# Patient Record
Sex: MALE | Race: WHITE
[De-identification: names, ages, dates, MRNs, and addresses within clinical notes are randomized per-mention and may not be internally consistent; named-entity substitution may affect disease eponyms.]

---

## 2018-07-25 ENCOUNTER — HOSPITAL ENCOUNTER (EMERGENCY)
Dept: HOSPITAL 97 - ER | Age: 38
Discharge: HOME | End: 2018-07-25
Payer: COMMERCIAL

## 2018-07-25 DIAGNOSIS — Z88.1: ICD-10-CM

## 2018-07-25 DIAGNOSIS — R53.1: ICD-10-CM

## 2018-07-25 DIAGNOSIS — Z96.643: ICD-10-CM

## 2018-07-25 DIAGNOSIS — E78.5: ICD-10-CM

## 2018-07-25 DIAGNOSIS — R42: Primary | ICD-10-CM

## 2018-07-25 DIAGNOSIS — I10: ICD-10-CM

## 2018-07-25 LAB
ALBUMIN SERPL BCP-MCNC: 4.3 G/DL (ref 3.4–5)
ALP SERPL-CCNC: 68 U/L (ref 45–117)
ALT SERPL W P-5'-P-CCNC: 28 U/L (ref 12–78)
AST SERPL W P-5'-P-CCNC: 16 U/L (ref 15–37)
BUN BLD-MCNC: 15 MG/DL (ref 7–18)
CKMB CREATINE KINASE MB: < 1 NG/ML (ref 0.3–3.6)
GLUCOSE SERPLBLD-MCNC: 100 MG/DL (ref 74–106)
HCT VFR BLD CALC: 39.7 % (ref 39.6–49)
INR BLD: 0.97
LYMPHOCYTES # SPEC AUTO: 2.6 K/UL (ref 0.7–4.9)
MAGNESIUM SERPL-MCNC: 2.2 MG/DL (ref 1.8–2.4)
MCH RBC QN AUTO: 29.8 PG (ref 27–35)
MCV RBC: 87.4 FL (ref 80–100)
NT-PROBNP SERPL-MCNC: 13 PG/ML (ref ?–125)
PMV BLD: 8.1 FL (ref 7.6–11.3)
POTASSIUM SERPL-SCNC: 3.8 MMOL/L (ref 3.5–5.1)
RBC # BLD: 4.54 M/UL (ref 4.33–5.43)

## 2018-07-25 PROCEDURE — 83735 ASSAY OF MAGNESIUM: CPT

## 2018-07-25 PROCEDURE — 70450 CT HEAD/BRAIN W/O DYE: CPT

## 2018-07-25 PROCEDURE — 99284 EMERGENCY DEPT VISIT MOD MDM: CPT

## 2018-07-25 PROCEDURE — 36415 COLL VENOUS BLD VENIPUNCTURE: CPT

## 2018-07-25 PROCEDURE — 80076 HEPATIC FUNCTION PANEL: CPT

## 2018-07-25 PROCEDURE — 71045 X-RAY EXAM CHEST 1 VIEW: CPT

## 2018-07-25 PROCEDURE — 85730 THROMBOPLASTIN TIME PARTIAL: CPT

## 2018-07-25 PROCEDURE — 81003 URINALYSIS AUTO W/O SCOPE: CPT

## 2018-07-25 PROCEDURE — 84484 ASSAY OF TROPONIN QUANT: CPT

## 2018-07-25 PROCEDURE — 80048 BASIC METABOLIC PNL TOTAL CA: CPT

## 2018-07-25 PROCEDURE — 82550 ASSAY OF CK (CPK): CPT

## 2018-07-25 PROCEDURE — 93005 ELECTROCARDIOGRAM TRACING: CPT

## 2018-07-25 PROCEDURE — 83880 ASSAY OF NATRIURETIC PEPTIDE: CPT

## 2018-07-25 PROCEDURE — 96360 HYDRATION IV INFUSION INIT: CPT

## 2018-07-25 PROCEDURE — 85610 PROTHROMBIN TIME: CPT

## 2018-07-25 PROCEDURE — 96361 HYDRATE IV INFUSION ADD-ON: CPT

## 2018-07-25 PROCEDURE — 82553 CREATINE MB FRACTION: CPT

## 2018-07-25 PROCEDURE — 85025 COMPLETE CBC W/AUTO DIFF WBC: CPT

## 2018-07-25 NOTE — EDPHYS
Physician Documentation                                                                           

 [facility]                                                                

Name: Raghu Lay                                                                              

Age: 38 yrs                                                                                       

Sex: Male                                                                                         

: 1980                                                                                   

MRN: [id number]                                                                                   

Arrival Date: 2018                                                                          

Time: 21:11                                                                                       

Account#: [id number]                                                                            

Bed 18                                                                                            

Private MD:                                                                                       

ED Physician Benjamin Lowe                                                                      

HPI:                                                                                              

                                                                                             

21:48 This 38 yrs old  Male presents to ER via Ambulatory with complaints of         cp  

      Numbness Of Face, Near Syncope, Dizziness.                                                  

21:48 The patient's problem is reported as weakness, that is generalized, dizziness,          cp  

      lightheaded, near syncope, paresthesias of face. Onset: The symptoms/episode                

      began/occurred today. Duration: This was a single incident, now improved. Context:          

      symptoms became apparent this afternoon, occurred at home, occurred while the patient       

      was at rest, Possible contributing factors include: recent medication change, blood         

      pressure medication was increased.                                                          

21:48 Associated signs and symptoms: Pertinent negatives: abdominal pain, chest pain,         cp  

      confusion, headache, vomiting. Severity of symptoms: in the emergency department the        

      symptoms have improved markedly. Patient's baseline: Neuro: alert and fully oriented,       

      Motor: no deficits, Ambulation: walks without assistance, Speech: normal.                   

                                                                                                  

Historical:                                                                                       

- Allergies:                                                                                      

21:25 Azithromycin;                                                                           aj1 

- Home Meds:                                                                                      

21:25 lisinopril 20 mg Oral tab 1 tab once daily [Active]; Lipitor 10 mg Oral tab 1 tab once  aj1 

      daily [Active]; gabapentin oral oral nightly [Active];                                      

- PMHx:                                                                                           

21:25 Hypertension; Hyperlipidemia;                                                           aj1 

- PSHx:                                                                                           

21:25 bilateral hip replacements;                                                             aj1 

                                                                                                  

- Immunization history:: Flu vaccine is up to date.                                               

- Social history:: Smoking status: Patient/guardian denies using tobacco.                         

- Ebola Screening: : Patient denies travel to an Ebola-affected area in the 21 days               

  before illness onset.                                                                           

                                                                                                  

                                                                                                  

ROS:                                                                                              

21:55 Constitutional: Negative for body aches, chills, fever, poor PO intake.                 cp  

21:55 Eyes: Negative for injury, pain, redness, and discharge.                                cp  

21:55 ENT: Negative for drainage from ear(s), ear pain, sore throat, difficulty swallowing,       

      difficulty handling secretions.                                                             

21:55 Cardiovascular: Negative for chest pain, edema, palpitations.                               

21:55 Respiratory: Negative for cough, shortness of breath, wheezing.                             

21:55 Abdomen/GI: Negative for abdominal pain, nausea, vomiting, and diarrhea, constipation,      

      anorexia, black/tarry stool, rectal bleeding.                                               

21:55 Back: Negative for radiated pain.                                                           

21:55 : Negative for urinary symptoms.                                                          

21:55 MS/extremity: Positive for paresthesias, of the face.                                       

21:55 Skin: Negative for cellulitis, rash.                                                        

21:55 Neuro: Positive for dizziness, near syncope, general weakness.                              

21:55 All other systems are negative.                                                             

                                                                                                  

Exam:                                                                                             

21:55 ECG was reviewed by the Attending Physician.                                            cp  

22:00 Constitutional: The patient appears in no acute distress, alert, awake, comfortable,    cp  

      non-diaphoretic, non-toxic, well developed, well nourished.                                 

22:00 Head/Face:  Normocephalic, atraumatic. Eyes:  Pupils equal round and reactive to light, cp  

      extra-ocular motions intact.  Lids and lashes normal.  Conjunctiva and sclera are           

      non-icteric and not injected.  Cornea within normal limits.  Periorbital areas with no      

      swelling, redness, or edema. ENT:  Nares patent. No nasal discharge, no septal              

      abnormalities noted.  Tympanic membranes are normal and external auditory canals are        

      clear.  Oropharynx with no redness, swelling, or masses, exudates, or evidence of           

      obstruction, uvula midline.  Mucous membranes moist. Neck:  Trachea midline, no             

      thyromegaly or masses palpated, and no cervical lymphadenopathy.  Supple, full range of     

      motion without nuchal rigidity, or vertebral point tenderness.  No Meningismus.             

      Chest/axilla:  Normal chest wall appearance and motion.  Nontender with no deformity.       

      No lesions are appreciated. Cardiovascular:  Regular rate and rhythm with a normal S1       

      and S2.  No gallops, murmurs, or rubs.  Normal PMI, no JVD.  No pulse deficits.             

      Respiratory:  Lungs have equal breath sounds bilaterally, clear to auscultation and         

      percussion.  No rales, rhonchi or wheezes noted.  No increased work of breathing, no        

      retractions or nasal flaring. Abdomen/GI:  Soft, non-tender, with normal bowel sounds.      

      No distension or tympany.  No guarding or rebound.  No evidence of tenderness               

      throughout. Skin:  Warm, dry with normal turgor.  Normal color with no rashes, no           

      lesions, and no evidence of cellulitis. Neuro:  Awake and alert, GCS 15, oriented to        

      person, place, time, and situation.  Cranial nerves II-XII grossly intact.  Motor           

      strength 5/5 in all extremities.  Sensory grossly intact.  Cerebellar exam normal.          

      Normal gait.                                                                                

23:22 Radiologist reports: no acute findings                                                    

                                                                                                  

Vital Signs:                                                                                      

21:25  / 105; Pulse 84; Resp 18; Temp 98.2; Pulse Ox 100% on R/A; Weight 113.4 kg (R);  aj1 

      Height 6 ft. 2 in. (187.96 cm) (R); Pain 0/10;                                              

23:09  / 88; Pulse 77; Resp 16 S; Pulse Ox 97% on R/A;                                  jd3 

23:17  / 76 Supine; Pulse 77; Resp 16 S; Pulse Ox 99% on R/A;                           jd3 

23:17  / 93 Sitting; Pulse 80; Resp 16 S; Pulse Ox 100% on R/A;                         jd3 

23:17  / 89 Standing; Pulse 78; Resp 16 S; Pulse Ox 100% on R/A; Pain 0/10;             jd3 

21:25 Body Mass Index 32.10 (113.40 kg, 187.96 cm)                                            aj 

23:17 doen by [facility] ER tech                                                                     jd3 

                                                                                                  

MDM:                                                                                              

21:39 Patient medically screened.                                                             cp  

22:00 Differential diagnosis: CVA, TIA, metabolic disorder, drug effects.                     cp  

23:29 Data reviewed: vital signs, nurses notes, lab test result(s), EKG, radiologic studies,  cp  

      CT scan, plain films.                                                                       

                                                                                                  

                                                                                             

21:47 Order name: Basic Metabolic Panel; Complete Time: 23:19                                 cp  

                                                                                             

23:19 Interpretation: Normal except: ; GFR 74.                                          cp  

                                                                                             

21:47 Order name: CBC with Diff; Complete Time: 23:19                                         cp  

                                                                                             

23:19 Interpretation: Normal except: HGB 13.5.                                                cp  

                                                                                             

21:47 Order name: Ckmb; Complete Time: 23:19                                                  cp  

                                                                                             

21:47 Order name: CPK; Complete Time: 23:19                                                   cp  

                                                                                             

21:47 Order name: LFT's; Complete Time: 23:19                                                 cp  

                                                                                             

21:47 Order name: Magnesium; Complete Time: 23:19                                             cp  

                                                                                             

21:47 Order name: NT PRO-BNP; Complete Time: 23:19                                            cp  

                                                                                             

21:47 Order name: PT-INR; Complete Time: 23:19                                                cp  

                                                                                             

21:47 Order name: Ptt, Activated; Complete Time: 23:19                                        cp  

                                                                                             

21:47 Order name: Troponin (emerg Dept Use Only); Complete Time: 23:19                        cp  

                                                                                             

23:20 Interpretation: TROPED < 0.02; Reviewed.                                                  

                                                                                             

21:47 Order name: XRAY Chest (1 view); Complete Time: 23:19                                   cp  

                                                                                             

21:47 Order name: CT Head Brain wo Cont; Complete Time: 23:19                                 cp  

                                                                                             

23:26 Order name: Urine Dipstick--Ancillary (enter results)                                   rg2 

                                                                                             

21:38 Order name: EKG; Complete Time: 21:39                                                   cp  

                                                                                             

21:38 Order name: EKG - Nurse/Tech; Complete Time: 21:52                                      cp  

                                                                                             

21:38 Order name: Orthostatics; Complete Time: 23:25                                          cp  

                                                                                             

21:47 Order name: Cardiac monitoring; Complete Time: 21:51                                    cp  

                                                                                             

21:47 Order name: IV Saline Lock; Complete Time: 22:01                                        cp  

                                                                                             

21:47 Order name: Labs collected and sent; Complete Time: 22:16                               cp  

                                                                                             

21:47 Order name: O2 Per Protocol; Complete Time: 21:52                                       cp  

                                                                                             

21:47 Order name: O2 Sat Monitoring; Complete Time: 21:52                                     cp  

                                                                                             

21:47 Order name: Urine Dipstick-Ancillary (obtain specimen); Complete Time: 23:25            cp  

                                                                                                  

EC:55 Rate is 72 beats/min. Rhythm is regular. MA interval is normal. QRS interval is         cp  

      prolonged at 110 msec. QT interval is normal. No ST changes noted. Interpreted by me.       

      Reviewed by me.                                                                             

                                                                                                  

Administered Medications:                                                                         

22:09 Drug: NS 0.9% 1000 ml Route: IV; Rate: 1 bolus; Site: right antecubital;                jd3 

23:55 Follow up: Response: No adverse reaction; IV Status: Completed infusion; IV Intake:     jd3 

      1000ml                                                                                      

                                                                                                  

                                                                                                  

Disposition:                                                                                      

                                                                                             

06:55 Co-signature as Attending Physician, Benjamin Lowe MD I agree with the assessment and  danie 

      plan of care.                                                                               

                                                                                                  

Disposition:                                                                                      

18 23:32 Discharged to Home. Impression: Dizziness, Weakness - General.                     

- Condition is Stable.                                                                            

- Discharge Instructions: Dizziness, Near-Syncope, Weakness, Managing Your Hypertension.          

                                                                                                  

- Medication Reconciliation Form, Thank You Letter, Antibiotic Education, Prescription            

  Opioid Use form.                                                                                

- Follow up: Private Physician; When: 1 - 2 days; Reason: Recheck today's complaints.             

- Problem is new.                                                                                 

- Symptoms have improved.                                                                         

                                                                                                  

                                                                                                  

                                                                                                  

Signatures:                                                                                       

Dispatcher [employer]                           EDVidya Carvajal RN                     RN   aj1                                                  

Benjamin Lowe MD MD cha Page, Corey, PA PA cp Davies, Jonathon, RN                    RN   jd3                                                  

                                                                                                  

Corrections: (The following items were deleted from the chart)                                    

                                                                                             

23:55 23:32 2018 23:32 Discharged to Home. Impression: Dizziness; Weakness - General.   jd3 

      Condition is Stable. Forms are Medication Reconciliation Form, Thank You Letter,            

      Antibiotic Education, Prescription Opioid Use. Follow up: Private Physician; When: 1 -      

      2 days; Reason: Recheck today's complaints. Problem is new. Symptoms have improved. cp      

                                                                                                  

**************************************************************************************************

## 2018-07-25 NOTE — RAD REPORT
EXAM DESCRIPTION:  RAD - Chest Single View - 7/25/2018 10:07 pm

 

CLINICAL HISTORY:  Hypertension

Chest pain.

 

COMPARISON:  Chest Single View dated 1/30/2017

 

FINDINGS:  Portable technique limits examination quality.

 

The lungs are grossly clear. The heart is normal in size. No displaced fractures.

 

IMPRESSION:  No acute intrathoracic process suspected.

## 2018-07-25 NOTE — XMS REPORT
Continuity of Care Document

 Created on:2018



Patient:CARMEN ORTIZ

Sex:Male

:1980

External Reference #:[id number]





Demographics







 Address  [address] [place] [id number]

 

 Phone  [phone]

 

 Preferred Language  Unknown

 

 Marital Status  Unknown

 

 [employer] Affiliation  Unknown

 

 Race  Unknown

 

 Ethnic Group  Unknown









Author







 Organization  [facility]









Problems







 Problem  Status  Onset  Classification  Date  Comments  Source



     Date    Reported    



             



             

 

 Fluid level      Diagnosis  2018    [facility]



 behind    8        



 tympanic            



 membrane            



             



             

 

 Acute right      Diagnosis  2018    [facility]



 otitis media    8        



             



             

 

 Body mass      Diagnosis  2018    [facility]



 index 30+ -    8        



 obesity            



             



             

 

 Elevated      Diagnosis  2018    [facility]



 blood    8        



 pressure            



             



             







Medications







 Medication  Details  Route  Status  Patient  Ordering  Order  Source



         Instructions  Provider  Date  



               



               



               

 

 Amoxicillin  amoxicillin    Active        [facility]



 500 MG Oral  500 mg            



 Capsule  capsule Take            



   1 capsule            



   twice a day            



   by oral route            



   for 7 days.            



               



               

 

 Fluticasone  fluticasone    Active        [facility]



 propionate  50            



 0.05 MG/ACTUAT  mcg/actuation            



 Metered Dose  nasal            



 Nasal Spray  spray,suspens            



   ion Spray 1            



   spray every            



   day by            



   intranasal            



   route.            



               



               

 

 Lisinopril 5  lisinopril 5    Active        [facility]



 MG Oral Tablet  mg tablet            



   TAKE ONE (1)            



   TABLET(S) BY            



   MOUTH ONCE A            



   DAY.            



               







Allergies, Adverse Reactions, Alerts







 Substance  Category  Reaction  Severity  Reaction  Status  Date  Comments  
Source



         type    Reported    



                 



                 



                 

 

 Azithromycin        Allergy to        [facility]



         substance    8    



                 



                 







Immunizations







 Immunization  Date Given  Site  Status  Last  Comments  Source



         Updated    



             



             

 

 Td(adult)  2015    completed      [facility]



 unspecified            



 formulation            



             







Results







 Order  Results  Value  Reference  Date  Interpretation  Comments  Source



 Name      Range        



               



               







Vital Signs







 Vital Sign  Value  Date  Comments  Source



         

 

 Diastolic (mm Hg)  90  2018    [facility]



         



         

 

 Height  74  2018    [facility]



         



         

 

 Systolic (mm Hg)  138  2018    [facility]



         



         

 

 Weight  250  2018    [facility]



         



         







Encounters







 Location  Location  Encounter  Encounter  Reason  Attending  ADM  DC  Status  
Source



   Details  Type  Number  For  Provider  Date  Date    



         Visit          



                   



                   



                   

 

 [place] -    Britt  [id number]    Britt        [facility]



 [facility]    [facility]  [phone]    [facility]  /      



 -    FNP-C:  [id number]            



 [id number]    [id number]  [id number]            



 [address]              



     [place], [place]              



     [id number]              



     , Ph.              



     [phone]              



     00              



                   



                   







Procedures







 Procedure  Code  Date  Perfomer  Comments  Source

## 2018-07-25 NOTE — RAD REPORT
EXAM DESCRIPTION:  CT - Head Brain Wo Cont - 7/25/2018 9:59 pm

 

CLINICAL HISTORY:  Right-sided facial numbness

 

COMPARISON:  CT head January 2017

 

TECHNIQUE:  Axial 5 mm thick images of the head were obtained without IV contrast.

 

All CT scans are performed using dose optimization technique as appropriate and may include automated
 exposure control or mA/KV adjustment according to patient size.

 

FINDINGS:  No intracranial hemorrhage, mass, edema or shift of mid-line structures. No acute infarcti
on changes seen. No abnormal extra-axial fluid collections. Ventricles are normal.

 

Mastoid air cells and visualized portions of the paranasal sinuses are clear.

 

No acute bony findings.

 

 

IMPRESSION:  Negative non-contrast CT head examination.  Continued, unexplained neurologic symptoms c
an be further addressed with MR imaging.

 

No significant change from comparison.

## 2018-07-25 NOTE — ER
Nurse's Notes                                                                                     

 [facility]                                                                

Name: Raghu Lay                                                                              

Age: 38 yrs                                                                                       

Sex: Male                                                                                         

: 1980                                                                                   

MRN: [id number]                                                                                   

Arrival Date: 2018                                                                          

Time: 21:11                                                                                       

Account#: [id number]                                                                            

Bed 18                                                                                            

Private MD:                                                                                       

Diagnosis: Dizziness;Weakness-General                                                             

                                                                                                  

Presentation:                                                                                     

                                                                                             

21:18 Presenting complaint: Patient states: Light-headed and dizzy. States he has been having aj1 

      BP issues so he checked it and it was 153/97 and he has not been feeling good, then the     

      right side of his face started to feel numb. Reports generalized weakness, but states       

      that he does not have more weakness on one side than the other. States that his facial      

      numbness has resolved, but now his right arm is tingling. He his cardiologist yesterday     

      and had a carotid ultrasound which was normal and a EKG which was also normal. Equal        

      smile,  equal bilaterally, gait steady. Transition of care: patient was not            

      received from another setting of care. Onset of symptoms was 2018 at 20:00.        

      Risk Assessment: Do you want to hurt yourself or someone else? Patient reports no           

      desire to harm self or others. Initial Sepsis Screen: Does the patient meet any 2           

      criteria? No. Patient's initial sepsis screen is negative. Does the patient have a          

      suspected source of infection? No. Patient's initial sepsis screen is negative. Care        

      prior to arrival: None.                                                                     

21:18 Method Of Arrival: Ambulatory                                                           aj1 

21:18 Acuity: RAMONITA 3                                                                           aj1 

                                                                                                  

Triage Assessment:                                                                                

21:25 General: Appears in no apparent distress. uncomfortable, Behavior is calm, cooperative, aj1 

      appropriate for age. Pain: Denies pain. Neuro: Level of Consciousness is awake, alert,      

      obeys commands,  are equal bilaterally Moves all extremities. Full function Gait       

      is steady, Speech is normal, Reports numbness in right arm. Cardiovascular: Patient's       

      skin is warm and dry. Respiratory: Airway is patent Respiratory effort is even,             

      unlabored, Respiratory pattern is regular, symmetrical.                                     

                                                                                                  

Historical:                                                                                       

- Allergies:                                                                                      

21:25 Azithromycin;                                                                           aj1 

- Home Meds:                                                                                      

21:25 lisinopril 20 mg Oral tab 1 tab once daily [Active]; Lipitor 10 mg Oral tab 1 tab once  aj1 

      daily [Active]; gabapentin oral oral nightly [Active];                                      

- PMHx:                                                                                           

21:25 Hypertension; Hyperlipidemia;                                                           aj1 

- PSHx:                                                                                           

21:25 bilateral hip replacements;                                                             aj1 

                                                                                                  

- Immunization history:: Flu vaccine is up to date.                                               

- Social history:: Smoking status: Patient/guardian denies using tobacco.                         

- Ebola Screening: : Patient denies travel to an Ebola-affected area in the 21 days               

  before illness onset.                                                                           

                                                                                                  

                                                                                                  

Screenin:34 Abuse screen: Denies threats or abuse. Nutritional screening: No deficits noted.        jd3 

      Tuberculosis screening: No symptoms or risk factors identified. Fall Risk Ambulatory        

      Aid- None/Bed Rest/Nurse Assist (0 pts). Gait- Normal/Bed Rest/Wheelchair (0 pts)           

      Mental Status- Oriented to own ability (0 pts). Total Felder Fall Scale indicates No         

      Risk (0-24 pts).                                                                            

                                                                                                  

Assessment:                                                                                       

21:34 General: Appears uncomfortable, Behavior is cooperative, appropriate for age, anxious.  jd3 

      Pain: Denies pain. Neuro: Level of Consciousness is awake, alert, obeys commands,           

      Oriented to person, place, time, situation, Appropriate for age  are equal             

      bilaterally Moves all extremities. Full function Gait is steady, Speech is normal,          

      Facial symmetry appears normal, Pupils are PERRLA, Intact Reports dizziness, weakness       

      generalized. Cardiovascular: Heart tones S1 S2 present Capillary refill < 3 seconds         

      Patient's skin is warm and dry. Respiratory: Airway is patent Respiratory effort is         

      even, unlabored, Respiratory pattern is regular, symmetrical, Breath sounds are clear       

      bilaterally. GI: Abdomen is round Bowel sounds present X 4 quads. : No signs and/or       

      symptoms were reported regarding the genitourinary system. EENT: No signs and/or            

      symptoms were reported regarding the EENT system. Derm: Skin is intact, Skin is dry,        

      Skin is normal, Skin temperature is warm. Musculoskeletal: Circulation, motion, and         

      sensation intact. Range of motion: intact in all extremities.                               

23:09 Reassessment: Patient appears in no apparent distress at this time. Patient and/or      jd3 

      family updated on plan of care and expected duration. Pain level reassessed. Patient is     

      alert, oriented x 3, equal unlabored respirations, skin warm/dry/pink.                      

                                                                                                  

Vital Signs:                                                                                      

21:25  / 105; Pulse 84; Resp 18; Temp 98.2; Pulse Ox 100% on R/A; Weight 113.4 kg (R);  aj1 

      Height 6 ft. 2 in. (187.96 cm) (R); Pain 0/10;                                              

23:09  / 88; Pulse 77; Resp 16 S; Pulse Ox 97% on R/A;                                  jd3 

23:17  / 76 Supine; Pulse 77; Resp 16 S; Pulse Ox 99% on R/A;                           jd3 

23:17  / 93 Sitting; Pulse 80; Resp 16 S; Pulse Ox 100% on R/A;                         jd3 

23:17  / 89 Standing; Pulse 78; Resp 16 S; Pulse Ox 100% on R/A; Pain 0/10;             jd3 

21:25 Body Mass Index 32.10 (113.40 kg, 187.96 cm)                                            aj1 

23:17 doen by [facility] ER tech                                                                     jd3 

                                                                                                  

ED Course:                                                                                        

21:11 Patient arrived in ED.                                                                  es  

21:23 Triage completed.                                                                       aj1 

21:25 Arm band placed on Patient placed in an exam room.                                      aj1 

21:29 Herbert Pradhan RN is Primary Nurse.                                                  jd3 

21:34 Patient has correct armband on for positive identification. Bed in low position. Call   jd3 

      light in reach. Side rails up X 1.                                                          

21:38 Benjamin Estrada PA is PHCP.                                                                cp  

21:38 Benjamin Lowe MD is Attending Physician.                                             cp  

21:50 Patient moved to CT.                                                                    2 

21:58 CT completed. Patient tolerated procedure well. Patient moved back from CT.             nj  

21:59 CT Head Brain wo Cont In Process Unspecified.                                           EDMS

22:00 Inserted saline lock: 20 gauge in right antecubital area, using aseptic technique.      jd3 

      Blood collected. placed by [facility] Swarm.                                                     

22:04 X-ray completed. Portable x-ray completed in exam room. Patient tolerated procedure     ag1 

      well.                                                                                       

22:05 XRAY Chest (1 view) In Process Unspecified.                                             EDMS

23:53 No provider procedures requiring assistance completed. IV discontinued, intact,         jd3 

      bleeding controlled, No redness/swelling at site. Pressure dressing applied.                

                                                                                                  

Administered Medications:                                                                         

22:09 Drug: NS 0.9% 1000 ml Route: IV; Rate: 1 bolus; Site: right antecubital;                jd3 

23:55 Follow up: Response: No adverse reaction; IV Status: Completed infusion; IV Intake:     jd3 

      1000ml                                                                                      

                                                                                                  

                                                                                                  

Intake:                                                                                           

23:55 IV: 1000ml; Total: 1000ml.                                                              jd3 

                                                                                                  

Outcome:                                                                                          

23:32 Discharge ordered by MD.                                                                cp  

23:54 Discharged to home ambulatory, with family.                                             jd3 

23:54 Condition: stable                                                                           

23:54 Discharge instructions given to patient, family, Instructed on discharge instructions,      

      follow up and referral plans. Demonstrated understanding of instructions, follow-up         

      care.                                                                                       

23:55 Patient left the ED.                                                                    jd3 

                                                                                                  

Signatures:                                                                                       

Dispatcher [employer]                           Vidya Mcgregor, OTONIEL                     RN   aj1                                                  

Poppy Coronel Ashley                             ag1                                                  

Benjamin Estrada PA PA cp Jordan, Nathan nj McGuire, Victoria                            2                                                  

Herbert Pradhan RN                    RN   jd3                                                  

                                                                                                  

**************************************************************************************************

## 2018-07-25 NOTE — XMS REPORT
Encounter Summary

 Created on:2018



Patient:Raghu Lay

Sex:Male

:1980

External Reference #:[id number]





Demographics







 Address  [address] [place] [id number]

 

 Home Phone  [phone]

 

 Preferred Language  Unknown

 

 Marital Status  

 

 Alevism Affiliation  Unknown

 

 Race  Unknown

 

 Ethnic Group  Unknown









Author







Reason for Visit







 Medical Complaint







Instructions







         1. Acute right otitis media

 

              amoxicillin 500 mg capsule

 

         2. Fluid level behind tympanic membrane

 

              fluticasone 50 mcg/actuation nasal spray,suspension

 

         3. Elevated blood pressure

 

              elevated blood pressure: care instructions

 

         4. Body mass index 30+ - obesity



Discussion Note: None recorded.



Plan of Care



     Patient Instructions





      Handouts given to patient giving further instructions and care regarding



 diagnosis recommended and discussed to F/U if symptoms persist or worsen.











 Reminders      Provider

 

      Appointments            None recorded.              



       

 

      Lab            None recorded.              



       

 

      Referral            None recorded.              



       

 

      Procedures            None recorded.              



       

 

      Surgeries            None recorded.              



       

 

      Imaging            None recorded.              



       







Medications







 Name  Start Date  









 amoxicillin 500 mg capsule  



  Take 1 capsule twice a day by oral route for 7 days.  

 

 fluticasone 50 mcg/actuation nasal spray,suspension  



  Spray 1 spray every day by intranasal route.  

 

 lisinopril 5 mg tablet  



  TAKE ONE (1) TABLET(S) BY MOUTH ONCE A DAY.  







Medications Administered

 None recorded.



Vitals







 Height  Weight  BMI  Blood Pressure

 

  6 ft 2 in   250 lbs   32.1 kg/m2   138/90 mm[Hg]







Lab Results

None recorded.



Allergies







 Code  Code System  Name  Reaction  Severity  Status  Onset



             

 

   RxNorm  Azithromycin      Active  







Problems

None recorded.



Procedures

None recorded.



Vaccine List







 Vaccine Type

 

 Td(adult) unspecified formulation

 

     2015







Social History







 Smoking Status  Former Smoker  







Past Encounters







 2018



 Acute Right Otitis Media; Fluid Level behind Tympanic Membrane; Elevated Blood 
Pressure; Body Mass Index 30+ - Obesity



 [facility]: [id number], [place] [address], Ph. [phone]







History of Present Illness







   Headache

 

 Reported By:  Patient

 

 HPI:  Location: occipital, temporal, band around head. Quality: not the worst



   headache ever, similar to previous headaches, aching, throbbing.



   Severity: pain level 5/10. Duration: occur many times in groups or



   clusters, started 3 days ago. Onset/Timing: worse, abrupt onset, occur



   daily. MIDAS: Migraine Disability Assessment 1. On how many days in the



   last 3 months did you miss work or school because of your headaches? 3,



   2. How many days in the last 3 months was your productivity at work or



   school reduced by half or more because of your headaches? 3 (Do not



   include days counted in question 1), 3. On how many days in the last 3



   months did you not do household work because of headaches? 0, 4. How



   many days in the last 3 months was your productivity in household work



   reduced by half or more because of your headaches? 3 (Do not include



   days counted in Question 3.), 5. On how many days in the last 3 months



   did you miss family, social or leisure activities because of your



   headaches? 0, Score (add 1-5): 6 to 10 - Grade 2 - Mild or infrequent



   disability, A. On how many days in the last three months did you have a



   headache? 3, B. On a scale of 0 - 10, on average how painful were these



   headaches (where 0=no pain at all and 10=pain as bad as it can be)? 5.



   Modifying factors: OTC medication, nothing gives relief, nothing makes



   it worse. Associated Symptoms: no fever/chills, no muscle aches, no



   headache, no vomiting, no sensitivity to light, tearing/watery eyes, no



   confusion, no slurred speech, no double vision, no motor paralysis, no



   sleep disturbances, no hoarseness, no sore throat, no hearing loss







Review of Systems







   Basic

 

 Reported By:  Patient

 

 Constitutional:  Constitutional: no fever

 

 Eyes:  Eyes: no eye complaints

 

 Ears-Nose-Mouth-Throat:  Ears: no ear complaints. Nose: nose/sinus problems.



   Mouth/Throat: no sore throat

 

 Respiratory:  Respiratory: no cough, no wheezing

 

 Gastrointestinal:  Gastrointestinal: no abdominal pain

 

 Genitourinary:  Genitourinary: no urinary complaints

 

 Musculoskeletal:  Musculoskeletal: no back pain

 

 Skin:  Skin: no rashes

 

 Neurologic:  Neurologic: no headaches, headache







Physical Exam







   Adult Basic, Adult Male Complete

 

 Reported By:  Patient

 

 Constitutional:  General Appearance: healthy-appearing, well-nourished,



   well-developed. Level of Distress: NAD. Ambulation: ambulating



   normally

 

 Psychiatric:  Mental Status: active and alert. Orientation: to time, to



   place, to person

 

 Eyes:  Lids and Conjunctivae: non-injected, no discharge, no pallor.



   Pupils: PERRLA. Corneas: grossly intact, fluorescein



   stain--normal. EOM: EOMI. Lens: clear. Sclerae: non-icteric.



   Vision: acuity grossly intact, peripheral vision grossly



   intact

 

 Ear-Nose-Mouth-Throat:  Ears: no lesions on external ear, no outer ear 
tenderness,



   EACs clear, TM mobility normal, TM erythematous, middle ear



   fluid. Hearing: no hearing loss. Nose: no lesions on external



   nose, nares patent, no septal deviation, nasal passages clear,



   no sinus tenderness, no nasal discharge. Lips, Teeth, and



   Gums: no mouth or lip ulcers, no bleeding gums, normal



   dentition. Oropharynx: moist mucous membranes, no erythema, no



   exudates, tonsils not enlarged

 

 Lungs:  Respiratory effort: no dyspnea, no tachypnea, no use of



   accessory muscles, no intercostal retractions. Auscultation:



   breath sounds normal, good air movement

 

 Cardiovascular:  Heart Auscultation: RRR, no murmurs. Pulses including femoral



   / pedal: normal throughout

 

 Musculoskeletal::  Motor Strength and Tone: normal motor strength, normal tone,



   normal. Joints, Bones, and Muscles: normal movement of all



   extremities, no bony abnormalities, no contractures, no



   malalignment, no tenderness. Extremities: no cyanosis, no



   edema, no varicosities, no palpable cord

 

 Neurologic:  Gait and Station: normal gait, normal station. Cranial Nerves:



   grossly intact. Sensation: grossly intact. Reflexes: DTRs 2+



   bilaterally throughout

 

 Skin:  Inspection and palpation: no rash, no lesions, no ulcer, no



   abnormal nevi, no induration, no nodules, good turgor, no



   jaundice. Nails: normal

 

 Back:  Thoracolumbar Appearance: normal curvature

 

 Abdomen:  Bowel Sounds: normal

## 2018-07-26 NOTE — EKG
Test Date:    2018-07-25               Test Time:    21:46:10

Technician:   GARRY                                    

                                                     

MEASUREMENT RESULTS:                                       

Intervals:                                           

Rate:         72                                     

OK:           140                                    

QRSD:         110                                    

QT:           384                                    

QTc:          420                                    

Axis:                                                

P:            42                                     

OK:           140                                    

QRS:          3                                      

T:            29                                     

                                                     

INTERPRETIVE STATEMENTS:                                       

                                                     

Normal sinus rhythm

Normal ECG

Compared to ECG 01/30/2017 10:42:35

No significant changes



Electronically Signed On 07-26-18 06:47:58 CDT by Alvarado Castañeda